# Patient Record
Sex: MALE | Race: WHITE | NOT HISPANIC OR LATINO | Employment: STUDENT | URBAN - METROPOLITAN AREA
[De-identification: names, ages, dates, MRNs, and addresses within clinical notes are randomized per-mention and may not be internally consistent; named-entity substitution may affect disease eponyms.]

---
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
## 2024-01-15 ENCOUNTER — APPOINTMENT (OUTPATIENT)
Dept: PRIMARY CARE | Facility: CLINIC | Age: 19
End: 2024-01-15
Payer: COMMERCIAL

## 2024-01-24 ENCOUNTER — APPOINTMENT (OUTPATIENT)
Dept: PRIMARY CARE | Facility: CLINIC | Age: 19
End: 2024-01-24
Payer: COMMERCIAL

## 2024-02-01 ENCOUNTER — LAB (OUTPATIENT)
Dept: LAB | Facility: LAB | Age: 19
End: 2024-02-01
Payer: COMMERCIAL

## 2024-02-01 ENCOUNTER — OFFICE VISIT (OUTPATIENT)
Dept: PRIMARY CARE | Facility: CLINIC | Age: 19
End: 2024-02-01
Payer: COMMERCIAL

## 2024-02-01 VITALS
SYSTOLIC BLOOD PRESSURE: 116 MMHG | DIASTOLIC BLOOD PRESSURE: 74 MMHG | RESPIRATION RATE: 18 BRPM | OXYGEN SATURATION: 98 % | HEIGHT: 64 IN | BODY MASS INDEX: 25.85 KG/M2 | HEART RATE: 97 BPM | WEIGHT: 151.4 LBS

## 2024-02-01 DIAGNOSIS — F41.9 ANXIETY: ICD-10-CM

## 2024-02-01 DIAGNOSIS — F41.9 ANXIETY: Primary | ICD-10-CM

## 2024-02-01 LAB
T4 FREE SERPL-MCNC: 0.79 NG/DL (ref 0.61–1.12)
TSH SERPL-ACNC: 1.37 MIU/L (ref 0.44–3.98)

## 2024-02-01 PROCEDURE — 99214 OFFICE O/P EST MOD 30 MIN: CPT | Performed by: NURSE PRACTITIONER

## 2024-02-01 PROCEDURE — 1036F TOBACCO NON-USER: CPT | Performed by: NURSE PRACTITIONER

## 2024-02-01 PROCEDURE — 84443 ASSAY THYROID STIM HORMONE: CPT

## 2024-02-01 PROCEDURE — 84439 ASSAY OF FREE THYROXINE: CPT

## 2024-02-01 PROCEDURE — 36415 COLL VENOUS BLD VENIPUNCTURE: CPT

## 2024-02-01 RX ORDER — SERTRALINE HYDROCHLORIDE 25 MG/1
25 TABLET, FILM COATED ORAL DAILY
Qty: 30 TABLET | Refills: 1 | Status: SHIPPED | OUTPATIENT
Start: 2024-02-01 | End: 2024-03-07 | Stop reason: SDUPTHER

## 2024-02-01 NOTE — PATIENT INSTRUCTIONS
Magnesium Glycinate 100-200 mg in am and pm- anxiety  Ashwaganda   Vitamin D 5000 units a day with food  Methylfolate + Methyl B12 for anxiety daily in the am liquid

## 2024-02-01 NOTE — PROGRESS NOTES
"Subjective   Patient ID: Chance Figueroa Jr. is a 18 y.o. male who presents for Follow-up (Chance is in today for routine 6 month F/U, he would like to discuss his increased anxiety. States it has not improved since his last OV. ).    18-year-old male here for follow-up uncontrolled anxiety- feels like throat closing, stomach aches feeling like N&V, frozen, racing heart  Does not know triggers.  Happens frequently.  Anxiety from 9 years old.   Tried counseling does not think that it helped and he does not like to talk about the anxiety because it causes more anxiety.   May 2023 he saw his PCP Jenni Ochoa NP she prescribed BuSpar.  Patient was prescribed twice daily however every time he took it it made him very dizzy and lightheaded and tired he was told to take both tablets at nighttime.  He still gets the same symptoms of dizzy lightheaded and it is not controlling anxiety.  I educated on the medication and mode of action.  He states a lot of times he feels like stuff gets stuck in his throat he is not quite sure if it is reflux or certain food.  And that is the very first symptom he gets with his anxiety is he feels like something is stuck in his throat         Review of Systems    Objective   /74   Pulse 97   Resp 18   Ht 1.626 m (5' 4\")   Wt 68.7 kg (151 lb 6.4 oz)   SpO2 98%   BMI 25.99 kg/m²     Physical Exam  Constitutional:       Appearance: He is obese.   Neck:      Comments: Thyroid gland is palpable I cannot say it is enlarged.  It is rubbery and smooth and I cannot appreciate any nodules.  Nontender and no cervical lymph nodes palpated  Cardiovascular:      Rate and Rhythm: Normal rate and regular rhythm.   Pulmonary:      Effort: Pulmonary effort is normal.      Breath sounds: Normal breath sounds.   Abdominal:      General: Bowel sounds are normal.      Palpations: Abdomen is soft.   Musculoskeletal:         General: Normal range of motion.   Neurological:      Mental Status: He " is alert and oriented to person, place, and time.   Psychiatric:         Mood and Affect: Mood normal.         Behavior: Behavior normal.         Assessment/Plan   Diagnoses and all orders for this visit:  Anxiety  Comments:  Uncontrolled with what sounds like panic.  Orders:  -     sertraline (Zoloft) 25 mg tablet; Take 1 tablet (25 mg) by mouth once daily.  -     Thyroid Stimulating Hormone; Future  -     Thyroxine, Free; Future    I discussed anxiety possible causes.  He has not had his thyroid checked.  He does have a pronounced thyroid gland.  Will check a TSH and free T4.  In the past his vitamin D levels were low I encouraged him to take vitamin D daily to help with chronic inflammation and mood.  I educated on different types of medications to use for anxiety.  It would be better to prevent since his symptoms are not very frequent.  I think Lexapro and Celexa would be too strong for him.  He tried Paxil in the past without benefit.    Sertraline seems to be the one that makes the most sense to start at this time.  I discussed the possibility of worsening anxiety or new onset depression with the use of any antianxiety antidepressants.  He lives with his parents I asked that he would let them know that he is on a new medication so they can watch for changes in mood.  He should stop the medication ASAP if he should develop worsening anxiety or depression.  Let me know in a month how he is feeling on the sertraline 25 mg daily and we will determine the next step

## 2024-02-12 DIAGNOSIS — R29.898 LEG WEAKNESS, BILATERAL: Primary | ICD-10-CM

## 2024-02-13 ENCOUNTER — LAB (OUTPATIENT)
Dept: LAB | Facility: LAB | Age: 19
End: 2024-02-13
Payer: COMMERCIAL

## 2024-02-13 DIAGNOSIS — R29.898 LEG WEAKNESS, BILATERAL: ICD-10-CM

## 2024-02-13 PROCEDURE — 36415 COLL VENOUS BLD VENIPUNCTURE: CPT

## 2024-02-13 PROCEDURE — 80053 COMPREHEN METABOLIC PANEL: CPT

## 2024-02-14 LAB
ALBUMIN SERPL BCP-MCNC: 5 G/DL (ref 3.4–5)
ALP SERPL-CCNC: 63 U/L (ref 33–120)
ALT SERPL W P-5'-P-CCNC: 20 U/L (ref 10–52)
ANION GAP SERPL CALC-SCNC: 12 MMOL/L (ref 10–20)
AST SERPL W P-5'-P-CCNC: 18 U/L (ref 9–39)
BILIRUB SERPL-MCNC: 0.7 MG/DL (ref 0–1.2)
BUN SERPL-MCNC: 13 MG/DL (ref 6–23)
CALCIUM SERPL-MCNC: 9.7 MG/DL (ref 8.6–10.3)
CHLORIDE SERPL-SCNC: 98 MMOL/L (ref 98–107)
CO2 SERPL-SCNC: 30 MMOL/L (ref 21–32)
CREAT SERPL-MCNC: 0.97 MG/DL (ref 0.5–1.3)
EGFRCR SERPLBLD CKD-EPI 2021: >90 ML/MIN/1.73M*2
GLUCOSE SERPL-MCNC: 84 MG/DL (ref 74–99)
POTASSIUM SERPL-SCNC: 4.4 MMOL/L (ref 3.5–5.3)
PROT SERPL-MCNC: 7.5 G/DL (ref 6.4–8.2)
SODIUM SERPL-SCNC: 136 MMOL/L (ref 136–145)

## 2024-03-07 ENCOUNTER — OFFICE VISIT (OUTPATIENT)
Dept: PRIMARY CARE | Facility: CLINIC | Age: 19
End: 2024-03-07
Payer: COMMERCIAL

## 2024-03-07 VITALS
BODY MASS INDEX: 26.46 KG/M2 | SYSTOLIC BLOOD PRESSURE: 116 MMHG | OXYGEN SATURATION: 97 % | WEIGHT: 155 LBS | HEART RATE: 89 BPM | RESPIRATION RATE: 18 BRPM | DIASTOLIC BLOOD PRESSURE: 80 MMHG | HEIGHT: 64 IN

## 2024-03-07 DIAGNOSIS — R53.83 OTHER FATIGUE: ICD-10-CM

## 2024-03-07 DIAGNOSIS — F41.9 ANXIETY: Primary | ICD-10-CM

## 2024-03-07 PROCEDURE — 99213 OFFICE O/P EST LOW 20 MIN: CPT | Performed by: NURSE PRACTITIONER

## 2024-03-07 PROCEDURE — 1036F TOBACCO NON-USER: CPT | Performed by: NURSE PRACTITIONER

## 2024-03-07 RX ORDER — SERTRALINE HYDROCHLORIDE 25 MG/1
25 TABLET, FILM COATED ORAL DAILY
Qty: 90 TABLET | Refills: 3 | Status: SHIPPED | OUTPATIENT
Start: 2024-03-07 | End: 2024-04-23

## 2024-03-07 NOTE — PROGRESS NOTES
"Subjective   Patient ID: Chance Figueroa Jr. is a 18 y.o. male who presents for medication follow up (Chance is in today to discuss medication. States that he has noticed a somewhat of a difference since taking the medication but not \"100 %\" stated that he feels fatigued at times. ).    Here for follow up since starting the Sertraline 25 mg po q hs for anxiety. Still with some anxiety but improving; more fatigued and not sure why.  No depression or worsening anxiety  Sleep 8 hours a night but lately feels like needs a nap during the day. Wakes up sleepy.   Taking vitamin D 5,000 a day, magnesium 100mg at night, , ashwaganda 300mg at hs.   No other acute issues  On Spring break from school and off work for 1 week.          Review of Systems    Objective   /80   Pulse 89   Resp 18   Ht 1.626 m (5' 4\")   Wt 70.3 kg (155 lb)   SpO2 97%   BMI 26.61 kg/m²     Physical Exam  Constitutional:       Appearance: He is normal weight.   Cardiovascular:      Rate and Rhythm: Normal rate and regular rhythm.   Pulmonary:      Effort: Pulmonary effort is normal.      Breath sounds: Normal breath sounds.   Abdominal:      General: Bowel sounds are normal.      Palpations: Abdomen is soft.   Musculoskeletal:         General: Normal range of motion.   Neurological:      Mental Status: He is alert and oriented to person, place, and time.   Psychiatric:         Mood and Affect: Mood normal.         Behavior: Behavior normal.         Assessment/Plan   Diagnoses and all orders for this visit:  Anxiety  Comments:  better on the sertraline. no change in dose for now.  Orders:  -     sertraline (Zoloft) 25 mg tablet; Take 1 tablet (25 mg) by mouth once daily.  Other fatigue  Comments:  possibly related to the ashwaganda. he can decrasee to 1/2 or stop them and see how he feels.  Other orders  -     Follow Up In Primary Care - Established  -     Follow Up In Primary Care - Established; Future    He can message me an update and " follow up in 3 months.   3 month supply sertraline sent to the pharmacy

## 2024-04-18 DIAGNOSIS — F41.9 ANXIETY: ICD-10-CM

## 2024-04-23 RX ORDER — SERTRALINE HYDROCHLORIDE 25 MG/1
25 TABLET, FILM COATED ORAL DAILY
Qty: 90 TABLET | Refills: 1 | Status: SHIPPED | OUTPATIENT
Start: 2024-04-23 | End: 2024-06-11 | Stop reason: ALTCHOICE

## 2024-06-11 ENCOUNTER — OFFICE VISIT (OUTPATIENT)
Dept: PRIMARY CARE | Facility: CLINIC | Age: 19
End: 2024-06-11
Payer: COMMERCIAL

## 2024-06-11 VITALS
OXYGEN SATURATION: 98 % | HEART RATE: 90 BPM | HEIGHT: 64 IN | SYSTOLIC BLOOD PRESSURE: 137 MMHG | WEIGHT: 159 LBS | BODY MASS INDEX: 27.14 KG/M2 | DIASTOLIC BLOOD PRESSURE: 83 MMHG

## 2024-06-11 DIAGNOSIS — F41.9 ANXIETY: Primary | ICD-10-CM

## 2024-06-11 DIAGNOSIS — M25.831 MASS OF JOINT OF RIGHT WRIST: ICD-10-CM

## 2024-06-11 PROCEDURE — 1036F TOBACCO NON-USER: CPT | Performed by: NURSE PRACTITIONER

## 2024-06-11 PROCEDURE — 99214 OFFICE O/P EST MOD 30 MIN: CPT | Performed by: NURSE PRACTITIONER

## 2024-06-11 RX ORDER — SERTRALINE HYDROCHLORIDE 50 MG/1
50 TABLET, FILM COATED ORAL DAILY
Qty: 90 TABLET | Refills: 3 | Status: SHIPPED | OUTPATIENT
Start: 2024-06-11

## 2024-06-11 NOTE — PATIENT INSTRUCTIONS
[facility] this can help with tools to use for preventing the anxiety and controlling the anxiety

## 2024-06-11 NOTE — PROGRESS NOTES
"Subjective   Patient ID: Chance Figueroa Jr. is a 18 y.o. male who presents for Anxiety (Patient mentioned he is here today for his anxiety. Patient feels he was doing better after the last visit but now he is doing the same as before. ).    18 year old male here due to worsening anxiety. Here to review the anxiety which has gotten worse in the past month. Unknown reason. Denies stress. increased panic mostly at night  On the b12, vitamin D   Stopped the mag  Wondering about a MVI  RT wrist lump pain with ROM. New. Had an ultrasound at work and was told it looked like fluid. No injury. RT handed    Anxiety             Review of Systems    Objective   /83   Pulse 90   Ht 1.626 m (5' 4\")   Wt 72.1 kg (159 lb)   SpO2 98%   BMI 27.29 kg/m²     Physical Exam  Constitutional:       Appearance: Normal appearance.   Cardiovascular:      Rate and Rhythm: Normal rate and regular rhythm.   Pulmonary:      Effort: Pulmonary effort is normal.      Breath sounds: Normal breath sounds.   Musculoskeletal:         General: Normal range of motion.        Arms:       Comments: Nodular mass anterior wrist. No skin discoloration or change in temp   Neurological:      General: No focal deficit present.      Mental Status: He is alert and oriented to person, place, and time. Mental status is at baseline.   Psychiatric:         Mood and Affect: Mood normal.         Behavior: Behavior normal.         Thought Content: Thought content normal.         Judgment: Judgment normal.         Assessment/Plan   Diagnoses and all orders for this visit:  Anxiety  Comments:  Sertraline dose needs to be increased to 50 mg daily.  Referral placed for therapy close to home.  Call me as needed  Orders:  -     sertraline (Zoloft) 50 mg tablet; Take 1 tablet (50 mg) by mouth once daily.  Mass of joint of right wrist  Comments:  possible cyst. he wants to see Dr Trevor galvan at [facility]  Other orders  -     Follow Up In Primary Care " - Established

## 2024-08-14 ASSESSMENT — PROMIS GLOBAL HEALTH SCALE
RATE_GENERAL_HEALTH: VERY GOOD
RATE_AVERAGE_FATIGUE: MODERATE
EMOTIONAL_PROBLEMS: OFTEN
RATE_PHYSICAL_HEALTH: GOOD
RATE_MENTAL_HEALTH: VERY GOOD
CARRYOUT_PHYSICAL_ACTIVITIES: COMPLETELY
CARRYOUT_SOCIAL_ACTIVITIES: EXCELLENT
RATE_QUALITY_OF_LIFE: EXCELLENT
RATE_AVERAGE_PAIN: 2
RATE_SOCIAL_SATISFACTION: EXCELLENT

## 2024-08-15 ENCOUNTER — APPOINTMENT (OUTPATIENT)
Dept: PRIMARY CARE | Facility: CLINIC | Age: 19
End: 2024-08-15
Payer: COMMERCIAL

## 2024-08-15 ENCOUNTER — LAB (OUTPATIENT)
Dept: LAB | Facility: LAB | Age: 19
End: 2024-08-15
Payer: COMMERCIAL

## 2024-08-15 VITALS
DIASTOLIC BLOOD PRESSURE: 79 MMHG | WEIGHT: 161 LBS | BODY MASS INDEX: 27.49 KG/M2 | HEIGHT: 64 IN | SYSTOLIC BLOOD PRESSURE: 122 MMHG | HEART RATE: 90 BPM | OXYGEN SATURATION: 98 %

## 2024-08-15 DIAGNOSIS — Z02.0 SCHOOL PHYSICAL EXAM: ICD-10-CM

## 2024-08-15 LAB
BASOPHILS # BLD AUTO: 0.04 X10*3/UL (ref 0–0.1)
BASOPHILS NFR BLD AUTO: 0.7 %
EOSINOPHIL # BLD AUTO: 0.12 X10*3/UL (ref 0–0.7)
EOSINOPHIL NFR BLD AUTO: 2.1 %
ERYTHROCYTE [DISTWIDTH] IN BLOOD BY AUTOMATED COUNT: 12.2 % (ref 11.5–14.5)
HCT VFR BLD AUTO: 48 % (ref 41–52)
HGB BLD-MCNC: 16.4 G/DL (ref 13.5–17.5)
IMM GRANULOCYTES # BLD AUTO: 0.02 X10*3/UL (ref 0–0.7)
IMM GRANULOCYTES NFR BLD AUTO: 0.4 % (ref 0–0.9)
LYMPHOCYTES # BLD AUTO: 1.75 X10*3/UL (ref 1.2–4.8)
LYMPHOCYTES NFR BLD AUTO: 31 %
MCH RBC QN AUTO: 29.2 PG (ref 26–34)
MCHC RBC AUTO-ENTMCNC: 34.2 G/DL (ref 32–36)
MCV RBC AUTO: 86 FL (ref 80–100)
MONOCYTES # BLD AUTO: 0.58 X10*3/UL (ref 0.1–1)
MONOCYTES NFR BLD AUTO: 10.3 %
NEUTROPHILS # BLD AUTO: 3.13 X10*3/UL (ref 1.2–7.7)
NEUTROPHILS NFR BLD AUTO: 55.5 %
NRBC BLD-RTO: 0 /100 WBCS (ref 0–0)
PLATELET # BLD AUTO: 255 X10*3/UL (ref 150–450)
POC APPEARANCE, URINE: CLEAR
POC BILIRUBIN, URINE: NEGATIVE
POC BLOOD, URINE: NEGATIVE
POC COLOR, URINE: YELLOW
POC GLUCOSE, URINE: NEGATIVE MG/DL
POC KETONES, URINE: NEGATIVE MG/DL
POC LEUKOCYTES, URINE: NEGATIVE
POC NITRITE,URINE: NEGATIVE
POC PH, URINE: 6 PH
POC PROTEIN, URINE: NEGATIVE MG/DL
POC SPECIFIC GRAVITY, URINE: >=1.03
POC UROBILINOGEN, URINE: 0.2 EU/DL
RBC # BLD AUTO: 5.61 X10*6/UL (ref 4.5–5.9)
WBC # BLD AUTO: 5.6 X10*3/UL (ref 4.4–11.3)

## 2024-08-15 PROCEDURE — 3008F BODY MASS INDEX DOCD: CPT | Performed by: NURSE PRACTITIONER

## 2024-08-15 PROCEDURE — 36415 COLL VENOUS BLD VENIPUNCTURE: CPT

## 2024-08-15 PROCEDURE — 81003 URINALYSIS AUTO W/O SCOPE: CPT | Performed by: NURSE PRACTITIONER

## 2024-08-15 PROCEDURE — 85025 COMPLETE CBC W/AUTO DIFF WBC: CPT

## 2024-08-15 PROCEDURE — 1036F TOBACCO NON-USER: CPT | Performed by: NURSE PRACTITIONER

## 2024-08-15 PROCEDURE — 99395 PREV VISIT EST AGE 18-39: CPT | Performed by: NURSE PRACTITIONER

## 2024-08-15 NOTE — PROGRESS NOTES
"Subjective   Patient ID: Levy Figueroa Jr. is a 18 y.o. 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 ).    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  Remains on antibiotic feeling better afebrile no longer with sore throat.  Denies cough fever chills  Chronic anxiety.  Takes sertraline 50 mg daily.  No side effects states it is working well and he is doing very well  No other acute concerns    Prevention:  Breast Ca screen: Denies family history  Colon Ca Screen: Denies family history  Lung Ca Screen: Non-smoker no vaping.  Denies family history  Prostate Ca Screen: Denies family history  DEXA: Not indicated  CT Cardiac Score: Not indicated  Diabetes Screen: Denies family history.  He is gaining weight due to poor dietary choices we talked about this in the past and again today  Opthal: No acute concerns.  Dental: Regular checkups without acute concerns  Diet: High sugar content.  No pop  Work: as CNA.  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  Recent TB test through [facility].    He has chronic low back pain.  He feels he has good posture.  Chronic bilateral hip pain.  Has not had an x-ray of his lumbar spine            Review of Systems    Objective   /79   Pulse 90   Ht 1.626 m (5' 4\")   Wt 73 kg (161 lb)   SpO2 98%   BMI 27.64 kg/m²     Physical Exam  Vitals reviewed.   Constitutional:       General: He is not in acute distress.     Appearance: Normal appearance. He is obese.   HENT:      Head: Normocephalic and atraumatic.   Eyes:      Extraocular Movements: Extraocular movements intact.      Conjunctiva/sclera: Conjunctivae normal.      Pupils: Pupils are equal, round, and reactive to light.   Neck:      Vascular: No carotid bruit.   Cardiovascular:      Rate and Rhythm: Normal rate and regular " rhythm.      Pulses: Normal pulses.      Heart sounds: No murmur heard.  Pulmonary:      Effort: Pulmonary effort is normal.      Breath sounds: Normal breath sounds.   Abdominal:      General: Bowel sounds are normal. There is no distension.      Palpations: Abdomen is soft.      Tenderness: There is no abdominal tenderness.   Musculoskeletal:         General: Normal range of motion.      Cervical back: Normal range of motion and neck supple.      Comments: Spine is midline he does have very tight muscles of the lumbar area   Lymphadenopathy:      Cervical: No cervical adenopathy.   Skin:     General: Skin is warm and dry.   Neurological:      General: No focal deficit present.      Mental Status: He is alert and oriented to person, place, and time. Mental status is at baseline.   Psychiatric:         Mood and Affect: Mood normal.         Behavior: Behavior normal.         Assessment/Plan   Diagnoses and all orders for this visit:  School physical exam  Comments:  UA and CBC ordered per the request of the school.   Printout of his vaccinations given to the patient.  He may need titers of each vaccine  Orders:  -     CBC and Auto Differential; Future  -     POCT UA Automated manually resulted    Up-to-date on vaccinations.  [facility].  Will reach out to the patient to see if his school is requiring titers to be drawn on rubella, rubeola, mumps, varicella, Hep B

## 2024-08-19 DIAGNOSIS — Z92.29: ICD-10-CM

## 2024-08-19 DIAGNOSIS — Z92.29 HISTORY OF MEASLES, MUMPS, RUBELLA AND VARICELLA VIRUS VACCINE: Primary | ICD-10-CM

## 2024-08-19 DIAGNOSIS — Z86.19 HISTORY OF CHICKEN POX: ICD-10-CM

## 2024-08-21 ENCOUNTER — LAB (OUTPATIENT)
Dept: LAB | Facility: LAB | Age: 19
End: 2024-08-21
Payer: COMMERCIAL

## 2024-08-21 DIAGNOSIS — Z92.29 HISTORY OF MEASLES, MUMPS, RUBELLA AND VARICELLA VIRUS VACCINE: ICD-10-CM

## 2024-08-21 DIAGNOSIS — Z92.29: ICD-10-CM

## 2024-08-21 PROCEDURE — 86706 HEP B SURFACE ANTIBODY: CPT

## 2024-08-21 PROCEDURE — 86787 VARICELLA-ZOSTER ANTIBODY: CPT

## 2024-08-21 PROCEDURE — 86735 MUMPS ANTIBODY: CPT

## 2024-08-21 PROCEDURE — 86317 IMMUNOASSAY INFECTIOUS AGENT: CPT

## 2024-08-21 PROCEDURE — 36415 COLL VENOUS BLD VENIPUNCTURE: CPT

## 2024-08-21 PROCEDURE — 86765 RUBEOLA ANTIBODY: CPT

## 2024-08-22 LAB
HBV SURFACE AB SER-ACNC: >1000 MIU/ML
MEV IGG SER QL IA: NEGATIVE
MUMPS IGG ANTIBODY INDEX: 2 IA
MUV IGG SER IA-ACNC: POSITIVE
RUBEOLA IGG ANTIBODY INDEX: 0.7 IA
RUBV IGG SERPL IA-ACNC: 1.8 IA
RUBV IGG SERPL QL IA: POSITIVE
VARICELLA ZOSTER IGG INDEX: 6.3 IA
VZV IGG SER QL IA: POSITIVE

## 2024-08-27 ASSESSMENT — ENCOUNTER SYMPTOMS
NECK PAIN: 1
TROUBLE SWALLOWING: 1
DIARRHEA: 1
SWOLLEN GLANDS: 1
HEADACHES: 1
COUGH: 1
SORE THROAT: 1

## 2024-08-28 ENCOUNTER — OFFICE VISIT (OUTPATIENT)
Dept: PRIMARY CARE | Facility: CLINIC | Age: 19
End: 2024-08-28
Payer: COMMERCIAL

## 2024-08-28 VITALS
OXYGEN SATURATION: 96 % | HEIGHT: 64 IN | WEIGHT: 161 LBS | HEART RATE: 99 BPM | SYSTOLIC BLOOD PRESSURE: 132 MMHG | DIASTOLIC BLOOD PRESSURE: 80 MMHG | BODY MASS INDEX: 27.49 KG/M2

## 2024-08-28 DIAGNOSIS — J02.9 SORE THROAT: Primary | ICD-10-CM

## 2024-08-28 PROCEDURE — 99213 OFFICE O/P EST LOW 20 MIN: CPT | Performed by: NURSE PRACTITIONER

## 2024-08-28 PROCEDURE — 3008F BODY MASS INDEX DOCD: CPT | Performed by: NURSE PRACTITIONER

## 2024-08-28 ASSESSMENT — ENCOUNTER SYMPTOMS
COUGH: 1
TROUBLE SWALLOWING: 1
SWOLLEN GLANDS: 1
SORE THROAT: 1
FATIGUE: 1
ABDOMINAL PAIN: 1
MUSCULOSKELETAL PAIN: 1
NECK PAIN: 1
DIARRHEA: 1
HEADACHES: 1

## 2024-08-28 NOTE — PROGRESS NOTES
"Subjective   Patient ID: Levy Figueroa Jr. is a 18 y.o. male who presents for Sore Throat (Patient is here today for a sore throat that he has had for 5 days. Stated he has tested negative for strep and COVID. Patient did have strep about 3 weeks ago and was put on antibiotics), Fatigue, Abdominal Pain, Muscle Pain, and Headache.    18-year-old male here for acute sick visit.  He states that for 5 days he was having a sore throat, LT abd sharp pain. No fever. Headache. Fatigue, lightheaded  Mild sore throat today \"irritated\". \"I'm fine now\".   Has been working without any further symptoms.  Strep neg at [facility].  I discussed mono with him he does not know who he would have gotten mono from he has not been in relationship that would put him at high risk for at least 3 years.  He does not share utensils or beverages.  And in general his symptoms are improving    Sore Throat   This is a new problem. The current episode started in the past 7 days. The problem has been waxing and waning. The pain is worse on the left side. There has been no fever. The pain is at a severity of 6/10. The pain is severe. Associated symptoms include abdominal pain, coughing, diarrhea, ear pain, headaches, neck pain, swollen glands and trouble swallowing. He has had exposure to strep.        Review of Systems   Constitutional:  Positive for fatigue.   HENT:  Positive for ear pain, sore throat and trouble swallowing.    Respiratory:  Positive for cough.    Gastrointestinal:  Positive for abdominal pain and diarrhea.   Musculoskeletal:  Positive for neck pain.   Neurological:  Positive for headaches.       Objective   /80   Pulse 99   Ht 1.626 m (5' 4\")   Wt 73 kg (161 lb)   SpO2 96%   BMI 27.64 kg/m²     Physical Exam  Constitutional:       Appearance: Normal appearance.   HENT:      Head: Normocephalic and atraumatic.      Nose: Nose normal.      Mouth/Throat:      Mouth: Mucous membranes are moist.      Pharynx: Posterior " oropharyngeal erythema present.   Musculoskeletal:      Cervical back: Normal range of motion and neck supple. No tenderness.   Lymphadenopathy:      Cervical: No cervical adenopathy.   Neurological:      Mental Status: He is alert.         Assessment/Plan   Diagnoses and all orders for this visit:  Sore throat  Comments:  I agree with [facility] clinic this is viral pharyngitis.  Call as needed if symptoms return or worsen

## 2025-01-17 ENCOUNTER — PATIENT MESSAGE (OUTPATIENT)
Dept: PRIMARY CARE | Facility: CLINIC | Age: 20
End: 2025-01-17
Payer: COMMERCIAL

## 2025-01-27 ENCOUNTER — APPOINTMENT (OUTPATIENT)
Dept: PRIMARY CARE | Facility: CLINIC | Age: 20
End: 2025-01-27
Payer: COMMERCIAL

## 2025-01-27 VITALS
SYSTOLIC BLOOD PRESSURE: 130 MMHG | HEART RATE: 90 BPM | HEIGHT: 63 IN | WEIGHT: 165 LBS | DIASTOLIC BLOOD PRESSURE: 83 MMHG | OXYGEN SATURATION: 96 % | BODY MASS INDEX: 29.23 KG/M2

## 2025-01-27 DIAGNOSIS — S03.00XA DISLOCATION OF TEMPOROMANDIBULAR JOINT, INITIAL ENCOUNTER: Primary | ICD-10-CM

## 2025-01-27 DIAGNOSIS — J02.9 SORE THROAT: ICD-10-CM

## 2025-01-27 PROCEDURE — 99213 OFFICE O/P EST LOW 20 MIN: CPT

## 2025-01-27 PROCEDURE — 3008F BODY MASS INDEX DOCD: CPT

## 2025-01-27 RX ORDER — AMOXICILLIN 500 MG/1
500 CAPSULE ORAL EVERY 12 HOURS SCHEDULED
Qty: 14 CAPSULE | Refills: 0 | Status: SHIPPED | OUTPATIENT
Start: 2025-01-27 | End: 2025-02-03

## 2025-01-27 ASSESSMENT — PATIENT HEALTH QUESTIONNAIRE - PHQ9
2. FEELING DOWN, DEPRESSED OR HOPELESS: NOT AT ALL
1. LITTLE INTEREST OR PLEASURE IN DOING THINGS: NOT AT ALL
SUM OF ALL RESPONSES TO PHQ9 QUESTIONS 1 AND 2: 0

## 2025-01-27 NOTE — PROGRESS NOTES
"Subjective   Patient ID: Levy Figueroa Jr. is a 19 y.o. male who presents for Jaw Pain (Levy is here for left jaw and neck pain which causing headaches x 2 weeks. States he had x ray done and they did not find anything).    HPI   Pt in today with left jaw and neck pain. He had his wisdom teeth out in Dec. No issues. He did go and see the oral surgeon that pulled his wisdom teeth and he told him it was his TMJ. He then went to hid regular dentist and Xrays were done and they did not see anything on the xray. His dentist is going to make him a bite plate. He is having throat pain. Feels that it is swollen on the left side.     Review of Systems   All other systems reviewed and are negative.      Objective   /83   Pulse 90   Ht 1.6 m (5' 3\")   Wt 74.8 kg (165 lb)   SpO2 96%   BMI 29.23 kg/m²     Physical Exam  Vitals reviewed.   Constitutional:       Appearance: Normal appearance.   HENT:      Mouth/Throat:      Pharynx: Posterior oropharyngeal erythema present.   Cardiovascular:      Rate and Rhythm: Normal rate and regular rhythm.      Pulses: Normal pulses.      Heart sounds: Normal heart sounds.   Pulmonary:      Effort: Pulmonary effort is normal.      Breath sounds: Normal breath sounds.   Neurological:      General: No focal deficit present.      Mental Status: He is alert and oriented to person, place, and time.   Psychiatric:         Mood and Affect: Mood normal.         Behavior: Behavior normal.         Assessment/Plan   Diagnoses and all orders for this visit:  Dislocation of temporomandibular joint, initial encounter  Sore throat  -     amoxicillin (Amoxil) 500 mg capsule; Take 1 capsule (500 mg) by mouth every 12 hours for 7 days.       "

## 2025-01-29 PROBLEM — J02.9 SORE THROAT: Status: ACTIVE | Noted: 2023-05-08

## 2025-03-31 ENCOUNTER — APPOINTMENT (OUTPATIENT)
Dept: PRIMARY CARE | Facility: CLINIC | Age: 20
End: 2025-03-31
Payer: COMMERCIAL

## 2025-03-31 VITALS
DIASTOLIC BLOOD PRESSURE: 71 MMHG | HEIGHT: 64 IN | SYSTOLIC BLOOD PRESSURE: 127 MMHG | BODY MASS INDEX: 30.05 KG/M2 | HEART RATE: 86 BPM | OXYGEN SATURATION: 96 % | WEIGHT: 176 LBS

## 2025-03-31 DIAGNOSIS — R07.9 CHEST PAIN, UNSPECIFIED TYPE: Primary | ICD-10-CM

## 2025-03-31 PROCEDURE — 3008F BODY MASS INDEX DOCD: CPT

## 2025-03-31 PROCEDURE — 93000 ELECTROCARDIOGRAM COMPLETE: CPT

## 2025-03-31 PROCEDURE — 99214 OFFICE O/P EST MOD 30 MIN: CPT

## 2025-03-31 RX ORDER — METHYLPREDNISOLONE 4 MG/1
TABLET ORAL
Qty: 21 TABLET | Refills: 0 | Status: SHIPPED | OUTPATIENT
Start: 2025-03-31 | End: 2025-04-06

## 2025-03-31 RX ORDER — CYANOCOBALAMIN (VITAMIN B-12) 250 MCG
250 TABLET ORAL DAILY
COMMUNITY

## 2025-03-31 ASSESSMENT — ENCOUNTER SYMPTOMS
SORE THROAT: 0
CHILLS: 0
DIARRHEA: 0
NAUSEA: 0
SINUS PAIN: 0
SINUS PRESSURE: 0
DYSURIA: 0
SHORTNESS OF BREATH: 0
FEVER: 0
CHEST TIGHTNESS: 0
ABDOMINAL PAIN: 0
CONSTIPATION: 0

## 2025-03-31 ASSESSMENT — PATIENT HEALTH QUESTIONNAIRE - PHQ9
2. FEELING DOWN, DEPRESSED OR HOPELESS: NOT AT ALL
SUM OF ALL RESPONSES TO PHQ9 QUESTIONS 1 AND 2: 0
1. LITTLE INTEREST OR PLEASURE IN DOING THINGS: NOT AT ALL

## 2025-03-31 NOTE — PROGRESS NOTES
"Subjective   Patient ID: Levy Figueroa Jr. is a 19 y.o. male who presents for Chest Pain (Levy is here today for rib pain/ chest pain last few weeks, daul pain becomes sharp at times, denies work injury).    HPI   Pt here today with rib/chest pain. Started a few weeks ago. Does not recall injuring it. It is a dull pain that sometimes becomes sharp. Not taking anything for the pain. It comes and goes. EKG in office showed NSR. Deneis feeling short of breath. 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   Has never seen cardiology.   Review of Systems   Constitutional:  Negative for chills and fever.   HENT:  Negative for ear pain, sinus pressure, sinus pain and sore throat.    Respiratory:  Negative for chest tightness and shortness of breath.    Cardiovascular:  Negative for chest pain.   Gastrointestinal:  Negative for abdominal pain, constipation, diarrhea and nausea.   Genitourinary:  Negative for dysuria.   Skin:  Negative for rash.   Neurological:  Negative for syncope.       Objective   /71   Pulse 86   Ht 1.626 m (5' 4\")   Wt 79.8 kg (176 lb)   SpO2 96%   BMI 30.21 kg/m²     Physical Exam  Constitutional:       Appearance: Normal appearance. He is normal weight.   HENT:      Right Ear: Tympanic membrane normal. There is no impacted cerumen.      Left Ear: Tympanic membrane normal. There is no impacted cerumen.      Mouth/Throat:      Mouth: Mucous membranes are moist.   Eyes:      Conjunctiva/sclera: Conjunctivae normal.   Cardiovascular:      Rate and Rhythm: Normal rate.      Pulses: Normal pulses.      Heart sounds: Normal heart sounds.   Pulmonary:      Effort: Pulmonary effort is normal.   Abdominal:      General: Abdomen is flat. Bowel sounds are normal.      Palpations: Abdomen is soft.   Musculoskeletal:         General: Normal range of motion.   Skin:     General: Skin is warm and dry.      Capillary Refill: Capillary refill takes less than 2 seconds. "   Neurological:      General: No focal deficit present.      Mental Status: He is alert.   Psychiatric:         Mood and Affect: Mood normal.       Assessment/Plan   Diagnoses and all orders for this visit:  Chest pain, unspecified type  Comments:  EKG done in office normal.   Referral to cardiology.  Orders:  -     methylPREDNISolone (Medrol Dospak) 4 mg tablets; Take as directed on package.  -     Referral to Cardiology; Future  -     CBC and Auto Differential; Future  -     Comprehensive metabolic panel; Future

## 2025-03-31 NOTE — LETTER
March 31, 2025     Patient: Chance Figueroa Jr.   YOB: 2005   Date of Visit: 3/31/2025       To Whom It May Concern:    Chance Figueroa was seen in my clinic on 3/31/2025 at 3:20 pm. Please excuse Chance for his absence from school on this day to make the appointment. He has a cardiology appointment on 4/9/2025 @ 3:20 can he please be excused a few minutes early to make it to the appointment.     If you have any questions or concerns, please don't hesitate to call.         Sincerely,         Deandra Moore, BOBBY-CNP        CC: No Recipients

## 2025-04-01 LAB
ALBUMIN SERPL-MCNC: 5.1 G/DL (ref 3.6–5.1)
ALP SERPL-CCNC: 60 U/L (ref 36–130)
ALT SERPL-CCNC: 37 U/L (ref 9–46)
ANION GAP SERPL CALCULATED.4IONS-SCNC: 10 MMOL/L (CALC) (ref 7–17)
AST SERPL-CCNC: 23 U/L (ref 10–40)
BASOPHILS # BLD AUTO: 38 CELLS/UL (ref 0–200)
BASOPHILS NFR BLD AUTO: 0.6 %
BILIRUB SERPL-MCNC: 0.6 MG/DL (ref 0.2–1.2)
BUN SERPL-MCNC: 15 MG/DL (ref 7–25)
CALCIUM SERPL-MCNC: 10.1 MG/DL (ref 8.6–10.3)
CHLORIDE SERPL-SCNC: 100 MMOL/L (ref 98–110)
CO2 SERPL-SCNC: 30 MMOL/L (ref 20–32)
CREAT SERPL-MCNC: 0.86 MG/DL (ref 0.6–1.24)
EGFRCR SERPLBLD CKD-EPI 2021: 127 ML/MIN/1.73M2
EOSINOPHIL # BLD AUTO: 58 CELLS/UL (ref 15–500)
EOSINOPHIL NFR BLD AUTO: 0.9 %
ERYTHROCYTE [DISTWIDTH] IN BLOOD BY AUTOMATED COUNT: 12.9 % (ref 11–15)
GLUCOSE SERPL-MCNC: 84 MG/DL (ref 65–139)
HCT VFR BLD AUTO: 49.1 % (ref 38.5–50)
HGB BLD-MCNC: 16.9 G/DL (ref 13.2–17.1)
LYMPHOCYTES # BLD AUTO: 2093 CELLS/UL (ref 850–3900)
LYMPHOCYTES NFR BLD AUTO: 32.7 %
MCH RBC QN AUTO: 29.8 PG (ref 27–33)
MCHC RBC AUTO-ENTMCNC: 34.4 G/DL (ref 32–36)
MCV RBC AUTO: 86.4 FL (ref 80–100)
MONOCYTES # BLD AUTO: 480 CELLS/UL (ref 200–950)
MONOCYTES NFR BLD AUTO: 7.5 %
NEUTROPHILS # BLD AUTO: 3731 CELLS/UL (ref 1500–7800)
NEUTROPHILS NFR BLD AUTO: 58.3 %
PLATELET # BLD AUTO: 285 THOUSAND/UL (ref 140–400)
PMV BLD REES-ECKER: 10.7 FL (ref 7.5–12.5)
POTASSIUM SERPL-SCNC: 4.2 MMOL/L (ref 3.5–5.3)
PROT SERPL-MCNC: 7.9 G/DL (ref 6.1–8.1)
RBC # BLD AUTO: 5.68 MILLION/UL (ref 4.2–5.8)
SODIUM SERPL-SCNC: 140 MMOL/L (ref 135–146)
WBC # BLD AUTO: 6.4 THOUSAND/UL (ref 3.8–10.8)

## 2025-04-09 ENCOUNTER — OFFICE VISIT (OUTPATIENT)
Dept: CARDIOLOGY | Facility: HOSPITAL | Age: 20
End: 2025-04-09
Payer: COMMERCIAL

## 2025-04-09 VITALS
SYSTOLIC BLOOD PRESSURE: 129 MMHG | WEIGHT: 175.27 LBS | OXYGEN SATURATION: 96 % | HEART RATE: 110 BPM | DIASTOLIC BLOOD PRESSURE: 79 MMHG | BODY MASS INDEX: 30.08 KG/M2

## 2025-04-09 DIAGNOSIS — R00.2 HEART PALPITATIONS: Primary | ICD-10-CM

## 2025-04-09 DIAGNOSIS — R07.9 CHEST PAIN, UNSPECIFIED TYPE: ICD-10-CM

## 2025-04-09 DIAGNOSIS — R06.02 SHORTNESS OF BREATH: ICD-10-CM

## 2025-04-09 PROCEDURE — 1036F TOBACCO NON-USER: CPT | Performed by: NURSE PRACTITIONER

## 2025-04-09 PROCEDURE — 99204 OFFICE O/P NEW MOD 45 MIN: CPT | Performed by: NURSE PRACTITIONER

## 2025-04-09 PROCEDURE — 99214 OFFICE O/P EST MOD 30 MIN: CPT | Performed by: NURSE PRACTITIONER

## 2025-04-09 ASSESSMENT — ENCOUNTER SYMPTOMS
NEUROLOGICAL NEGATIVE: 1
ENDOCRINE NEGATIVE: 1
MUSCULOSKELETAL NEGATIVE: 1
GASTROINTESTINAL NEGATIVE: 1
PSYCHIATRIC NEGATIVE: 1
EYES NEGATIVE: 1
PALPITATIONS: 1
HEMATOLOGIC/LYMPHATIC NEGATIVE: 1
RESPIRATORY NEGATIVE: 1

## 2025-04-09 NOTE — PROGRESS NOTES
Referred by Dr. Moore for Chest Pain     History Of Present Illness:    Dear Deandra Moore CNP,     I had the pleasure of meeting Chance today at [facility] for evaluation of chest pain. The patient is seen in collaboration with Dr. Holder. Tiburcio is a very pleasant 19 year old gentleman without significant medical history, denies history of smoking. Has had persistent chest pain on the left side, describes as sharp in nature. Can occur at any time. Heart rate is elevated at rest, notices more at night. Denies shortness of breath. Complains of general fatigue.     Review of Systems   Constitutional: Positive for malaise/fatigue.   HENT: Negative.     Eyes: Negative.    Cardiovascular:  Positive for palpitations.   Respiratory: Negative.     Endocrine: Negative.    Hematologic/Lymphatic: Negative.    Skin: Negative.    Musculoskeletal: Negative.    Gastrointestinal: Negative.    Neurological: Negative.    Psychiatric/Behavioral: Negative.        Past Medical History:  He has a past medical history of Acute pharyngitis (05/08/2023), Anxiety disorder (05/08/2023), Cough (05/08/2023), Diarrhea (05/08/2023), Fracture of unspecified carpal bone, left wrist, initial encounter for closed fracture (08/11/2016), Malaise and fatigue (05/08/2023), Melanocytic nevus of right shoulder (05/08/2023), Other specified respiratory disorders (11/17/2016), Pneumonia, unspecified organism (11/17/2016), Short stature for age (05/08/2023), and Sore throat (05/08/2023).    Past Surgical History:  He has a past surgical history that includes Other surgical history (08/11/2016).      Social History:  He reports that he has never smoked. He has never been exposed to tobacco smoke. He has never used smokeless tobacco. He reports current alcohol use. He reports that he does not use drugs.    Family History:  No family history on file.     Allergies:  Poison ivy extract    Outpatient Medications:  Current  Outpatient Medications   Medication Instructions    cyanocobalamin (VITAMIN B-12) 250 mcg, Daily    ergocalciferol, vitamin D2, (VITAMIN D2 ORAL) Take by mouth.    sertraline (ZOLOFT) 50 mg, oral, Daily        Last Recorded Vitals:  Vitals:    04/09/25 1515   BP: 129/79   Pulse: 110   SpO2: 96%   Weight: 79.5 kg (175 lb 4.3 oz)       Physical Exam:  Physical Exam  Vitals reviewed.   HENT:      Head: Normocephalic.      Nose: Nose normal.   Eyes:      Pupils: Pupils are equal, round, and reactive to light.   Cardiovascular:      Rate and Rhythm: normal rhythm rate tachycardic   Pulmonary:      Effort: Pulmonary effort is normal.      Breath sounds: Normal breath sounds.   Abdominal:      General: Abdomen is flat.      Palpations: Abdomen is soft.   Musculoskeletal:         General: Normal range of motion.      Cervical back: Normal range of motion.   Skin:     General: Skin is warm and dry.   Neurological:      General: No focal deficit present.      Mental Status: He is alert and oriented to person, place, and time.   Psychiatric:         Mood and Affect: Mood normal.            Last Labs:  CBC -  Lab Results   Component Value Date    WBC 6.4 03/31/2025    HGB 16.9 03/31/2025    HCT 49.1 03/31/2025    MCV 86.4 03/31/2025     03/31/2025       CMP -  Lab Results   Component Value Date    CALCIUM 10.1 03/31/2025    PROT 7.9 03/31/2025    ALBUMIN 5.1 03/31/2025    AST 23 03/31/2025    ALT 37 03/31/2025    ALKPHOS 60 03/31/2025    BILITOT 0.6 03/31/2025       LIPID PANEL -   Lab Results   Component Value Date    CHOL 209 (H) 10/08/2022    TRIG 117 10/08/2022    HDL 47.7 10/08/2022    CHHDL 4.4 10/08/2022    LDLF 138 (H) 10/08/2022    VLDL 23 10/08/2022    NHDL 161 (H) 10/08/2022       RENAL FUNCTION PANEL -   Lab Results   Component Value Date    GLUCOSE 84 03/31/2025     03/31/2025    K 4.2 03/31/2025     03/31/2025    CO2 30 03/31/2025    ANIONGAP 10 03/31/2025    BUN 15 03/31/2025    CREATININE 0.86  "03/31/2025    CALCIUM 10.1 03/31/2025    ALBUMIN 5.1 03/31/2025        No results found for: \"BNP\", \"HGBA1C\"    Last Cardiology Tests:  ECG:  ECG independently reviewed showed sinus rhythm   Echo:    Ejection Fractions:    Cath:    Stress Test:    Cardiac Imaging:      Assessment/Plan   Chance is a very pleasant 19 year old gentleman without significant medical history, complains of intermittent sharp chest pain and an elevated heart rate at rest. He will have a heart monitor to assess for arrthymia. Echocardiogram to assess his LV function. Heart rate and blood pressure is well controlled today.     Plan   -call with any questions   -no medication changes   -CT calcium score   -echocardiogram   -heart monitor for two weeks   -will call to review the results     I appreciate the opportunity to participate in the patient's care, please call with any questions         Sherry Shepard, APRN-CNP  "

## 2025-04-09 NOTE — PATIENT INSTRUCTIONS
CALL WITH ANY QUESTIONS   NO MEDICATION CHANGES   CT CALCIUM SCORE   ECHOCARDIOGRAM   HEART MONITOR FOR TWO WEEKS   WILL CALL TO REVIEW THE RESULTS

## 2025-05-05 ENCOUNTER — APPOINTMENT (OUTPATIENT)
Dept: CARDIOLOGY | Facility: CLINIC | Age: 20
End: 2025-05-05
Payer: COMMERCIAL

## 2025-05-08 ENCOUNTER — HOSPITAL ENCOUNTER (OUTPATIENT)
Dept: CARDIOLOGY | Facility: CLINIC | Age: 20
Discharge: HOME | End: 2025-05-08
Payer: COMMERCIAL

## 2025-05-08 DIAGNOSIS — R00.2 HEART PALPITATIONS: ICD-10-CM

## 2025-05-08 DIAGNOSIS — R06.02 SHORTNESS OF BREATH: ICD-10-CM

## 2025-05-08 PROCEDURE — 93306 TTE W/DOPPLER COMPLETE: CPT

## 2025-05-08 PROCEDURE — 93246 EXT ECG>7D<15D RECORDING: CPT

## 2025-05-14 DIAGNOSIS — F41.9 ANXIETY: ICD-10-CM

## 2025-05-14 RX ORDER — SERTRALINE HYDROCHLORIDE 50 MG/1
50 TABLET, FILM COATED ORAL DAILY
Qty: 90 TABLET | Refills: 3 | Status: SHIPPED | OUTPATIENT
Start: 2025-05-14

## 2025-05-26 LAB
AORTIC VALVE MEAN GRADIENT: 3 MMHG
AORTIC VALVE PEAK VELOCITY: 1.18 M/S
AV PEAK GRADIENT: 6 MMHG
EJECTION FRACTION APICAL 4 CHAMBER: 46.6
EJECTION FRACTION: 58 %
LEFT ATRIUM VOLUME AREA LENGTH INDEX BSA: 21.2 ML/M2
LEFT VENTRICLE INTERNAL DIMENSION DIASTOLE: 4.84 CM (ref 3.5–6)
LEFT VENTRICULAR OUTFLOW TRACT DIAMETER: 2.19 CM
MITRAL VALVE E/A RATIO: 1.65
RIGHT VENTRICLE FREE WALL PEAK S': 15.35 CM/S
RIGHT VENTRICLE PEAK SYSTOLIC PRESSURE: 32.8 MMHG
TRICUSPID ANNULAR PLANE SYSTOLIC EXCURSION: 2.3 CM

## 2025-07-01 ENCOUNTER — APPOINTMENT (OUTPATIENT)
Dept: PRIMARY CARE | Facility: CLINIC | Age: 20
End: 2025-07-01
Payer: COMMERCIAL

## 2025-07-01 VITALS
HEART RATE: 88 BPM | DIASTOLIC BLOOD PRESSURE: 74 MMHG | HEIGHT: 64 IN | WEIGHT: 178 LBS | OXYGEN SATURATION: 96 % | BODY MASS INDEX: 30.39 KG/M2 | SYSTOLIC BLOOD PRESSURE: 110 MMHG

## 2025-07-01 DIAGNOSIS — R42 VERTIGO: ICD-10-CM

## 2025-07-01 DIAGNOSIS — R42 DIZZINESS: Primary | ICD-10-CM

## 2025-07-01 PROCEDURE — 1036F TOBACCO NON-USER: CPT

## 2025-07-01 PROCEDURE — 99213 OFFICE O/P EST LOW 20 MIN: CPT

## 2025-07-01 PROCEDURE — 3008F BODY MASS INDEX DOCD: CPT

## 2025-07-01 RX ORDER — MECLIZINE HCL 12.5 MG 12.5 MG/1
12.5 TABLET ORAL 3 TIMES DAILY PRN
Qty: 30 TABLET | Refills: 11 | Status: SHIPPED | OUTPATIENT
Start: 2025-07-01 | End: 2026-07-01

## 2025-07-01 ASSESSMENT — ENCOUNTER SYMPTOMS
CHILLS: 0
SINUS PRESSURE: 0
SORE THROAT: 0
DIARRHEA: 0
SINUS PAIN: 0
CONSTIPATION: 0
FEVER: 0
DYSURIA: 0
SHORTNESS OF BREATH: 0
NAUSEA: 0
CHEST TIGHTNESS: 0
ABDOMINAL PAIN: 0

## 2025-07-01 ASSESSMENT — PATIENT HEALTH QUESTIONNAIRE - PHQ9
SUM OF ALL RESPONSES TO PHQ9 QUESTIONS 1 AND 2: 0
1. LITTLE INTEREST OR PLEASURE IN DOING THINGS: NOT AT ALL
2. FEELING DOWN, DEPRESSED OR HOPELESS: NOT AT ALL

## 2025-07-01 NOTE — PROGRESS NOTES
"Subjective   Patient ID: Levy Figueroa Jr. is a 19 y.o. male who presents for Sick Visit (Patient is present in office for lightheadedness and nausea; no LOC x months - getting worse- when going up or down stairs or walking or standing. Drinking plenty of fluids and eating well. ).    HPI   Pt in today for sick visit. He has been dealing with lightheadedness and nausea. Has been going on for the last few months. Notices that it occurs more when going up or mercedes the stairs and he becomes short of breath. Feels that he is drinking plenty of fluids. Not taking anything for the symptoms. Has seen cardiology and wore a holter monitor everything was negative. Not currently exercising. Feels that he is drinking plenty of fluids.     Review of Systems   Constitutional:  Negative for chills and fever.   HENT:  Negative for ear pain, sinus pressure, sinus pain and sore throat.    Respiratory:  Negative for chest tightness and shortness of breath.    Cardiovascular:  Negative for chest pain.   Gastrointestinal:  Negative for abdominal pain, constipation, diarrhea and nausea.   Genitourinary:  Negative for dysuria.   Skin:  Negative for rash.   Neurological:  Negative for syncope.       Objective   /74   Pulse 88   Ht 1.626 m (5' 4\")   Wt 80.7 kg (178 lb)   SpO2 96%   BMI 30.55 kg/m²     Physical Exam  Vitals reviewed.   Constitutional:       Appearance: Normal appearance.   Cardiovascular:      Rate and Rhythm: Normal rate and regular rhythm.      Pulses: Normal pulses.      Heart sounds: Normal heart sounds.   Pulmonary:      Effort: Pulmonary effort is normal.      Breath sounds: Normal breath sounds.   Neurological:      General: No focal deficit present.      Mental Status: He is alert and oriented to person, place, and time.   Psychiatric:         Mood and Affect: Mood normal.         Behavior: Behavior normal.       Assessment/Plan   Diagnoses and all orders for this visit:  Dizziness  -     Referral to " Neurology; Future  -     Referral to ENT; Future  Vertigo  -     meclizine (Antivert) 12.5 mg tablet; Take 1 tablet (12.5 mg) by mouth 3 times a day as needed for dizziness.

## 2025-07-08 ENCOUNTER — APPOINTMENT (OUTPATIENT)
Dept: RADIOLOGY | Facility: HOSPITAL | Age: 20
End: 2025-07-08
Payer: COMMERCIAL

## 2025-07-29 ASSESSMENT — PROMIS GLOBAL HEALTH SCALE
CARRYOUT_PHYSICAL_ACTIVITIES: COMPLETELY
RATE_AVERAGE_FATIGUE: SEVERE
RATE_AVERAGE_PAIN: 0
RATE_SOCIAL_SATISFACTION: EXCELLENT
RATE_MENTAL_HEALTH: VERY GOOD
CARRYOUT_SOCIAL_ACTIVITIES: EXCELLENT
RATE_PHYSICAL_HEALTH: GOOD
RATE_GENERAL_HEALTH: VERY GOOD
RATE_QUALITY_OF_LIFE: EXCELLENT
EMOTIONAL_PROBLEMS: OFTEN

## 2025-07-30 ENCOUNTER — OFFICE VISIT (OUTPATIENT)
Dept: PRIMARY CARE | Facility: CLINIC | Age: 20
End: 2025-07-30
Payer: COMMERCIAL

## 2025-07-30 VITALS
HEIGHT: 64 IN | SYSTOLIC BLOOD PRESSURE: 120 MMHG | DIASTOLIC BLOOD PRESSURE: 79 MMHG | HEART RATE: 84 BPM | OXYGEN SATURATION: 96 % | WEIGHT: 180.2 LBS | BODY MASS INDEX: 30.77 KG/M2

## 2025-07-30 DIAGNOSIS — F41.9 ANXIETY: ICD-10-CM

## 2025-07-30 DIAGNOSIS — Z00.00 HEALTHCARE MAINTENANCE: Primary | ICD-10-CM

## 2025-07-30 PROCEDURE — 99395 PREV VISIT EST AGE 18-39: CPT

## 2025-07-30 PROCEDURE — 1036F TOBACCO NON-USER: CPT

## 2025-07-30 PROCEDURE — 3008F BODY MASS INDEX DOCD: CPT

## 2025-07-30 RX ORDER — SERTRALINE HYDROCHLORIDE 50 MG/1
50 TABLET, FILM COATED ORAL DAILY
Qty: 90 TABLET | Refills: 3 | Status: SHIPPED | OUTPATIENT
Start: 2025-07-30

## 2025-07-30 ASSESSMENT — ENCOUNTER SYMPTOMS
SHORTNESS OF BREATH: 0
SINUS PRESSURE: 0
FEVER: 0
ABDOMINAL PAIN: 0
DYSURIA: 0
NAUSEA: 0
CHEST TIGHTNESS: 0
CONSTIPATION: 0
CHILLS: 0
DIARRHEA: 0
SORE THROAT: 0
SINUS PAIN: 0

## 2025-07-30 NOTE — PROGRESS NOTES
Subjective   Patient ID: Levy Figueroa Jr. is a 19 y.o. male who presents for Annual Exam (Chance Figueroa Jr. is a 19 y.o. male is here today for a CPE. Patient reports No questions or concerns at this time./).    HPI   Pt is here for annual wellness.  Reports overall health is good. No issues or concerns.    Do you take any herbs or supplements that were not prescribed by a doctor? Yes magnesium and d3.   Are you taking calcium supplements? no  Are you taking aspirin daily? no  Colonoscopy: N/A  Fasting blood work: not needed  Last eye exam: not current  Last dental Exam: every six months  Exercise: not currently  Mood:pleasant  Sleep: no issues  Diet:  cutting out sugar, meal prepping struggles with carbs  Occupation:  nursing school  Do you have pain that bothers you in your daily life? Yes  Vaping   Rare occasions    1. Patient Counseling:  --Nutrition: Stressed importance of moderation in sodium/caffeine intake, saturated fat and cholesterol, caloric balance, sufficient intake of fresh fruits, vegetables, fiber, calcium, iron, and 1 mg of folate supplement per day (for females capable of pregnancy).  --Discussed the issue of estrogen replacement, calcium supplement, and the daily use of baby aspirin as appropriate per pt.  --Exercise: Stressed the importance of regular exercise.   --Substance Abuse: Discussed cessation/primary prevention of tobacco, alcohol, or other drug use; driving or other dangerous activities under the influence; availability of treatment for abuse.    --Sexuality: Discussed sexually transmitted diseases, partner selection, use of condoms, avoidance of unintended pregnancy  and contraceptive alternatives.   --Injury prevention: Discussed safety belts, safety helmets, smoke detector, smoking near bedding or upholstery.   --Dental health: Discussed importance of regular tooth brushing, flossing, and dental visits.  --Immunizations reviewed.  --Discussed benefits of colon cancer  "screening.  --After hours service discussed with patient  2 Discussed the patient's BMI.  The BMI is above average. The patient received Provided instructions on dietary changes  Provided instructions on exercise  Advised to Increase physical activity because they have an above normal BMI.  3 Follow up in one year    Review of Systems   Constitutional:  Negative for chills and fever.   HENT:  Negative for ear pain, sinus pressure, sinus pain and sore throat.    Respiratory:  Negative for chest tightness and shortness of breath.    Cardiovascular:  Negative for chest pain.   Gastrointestinal:  Negative for abdominal pain, constipation, diarrhea and nausea.   Genitourinary:  Negative for dysuria.   Skin:  Negative for rash.   Neurological:  Negative for syncope.       Objective   /79   Pulse 84   Ht 1.626 m (5' 4\")   Wt 81.7 kg (180 lb 3.2 oz)   SpO2 96%   BMI 30.93 kg/m²     Physical Exam  Vitals reviewed.   Constitutional:       Appearance: Normal appearance.     Cardiovascular:      Rate and Rhythm: Normal rate and regular rhythm.      Pulses: Normal pulses.      Heart sounds: Normal heart sounds.   Pulmonary:      Effort: Pulmonary effort is normal.      Breath sounds: Normal breath sounds.     Neurological:      General: No focal deficit present.      Mental Status: He is alert and oriented to person, place, and time.     Psychiatric:         Mood and Affect: Mood normal.         Behavior: Behavior normal.         Assessment/Plan   Diagnoses and all orders for this visit:  Healthcare maintenance  Anxiety  Comments:  Sertraline dose needs to be increased to 50 mg daily.  Referral placed for therapy close to home.  Call me as needed  Orders:  -     sertraline (Zoloft) 50 mg tablet; Take 1 tablet (50 mg) by mouth once daily.  Other orders  -     Follow Up In Primary Care - Health Maintenance; Future       "

## 2025-08-01 ENCOUNTER — HOSPITAL ENCOUNTER (EMERGENCY)
Facility: HOSPITAL | Age: 20
Discharge: HOME | End: 2025-08-01
Attending: EMERGENCY MEDICINE
Payer: COMMERCIAL

## 2025-08-01 VITALS
HEIGHT: 64 IN | BODY MASS INDEX: 29.02 KG/M2 | HEART RATE: 82 BPM | DIASTOLIC BLOOD PRESSURE: 87 MMHG | RESPIRATION RATE: 18 BRPM | SYSTOLIC BLOOD PRESSURE: 146 MMHG | WEIGHT: 170 LBS | OXYGEN SATURATION: 98 % | TEMPERATURE: 98.1 F

## 2025-08-01 DIAGNOSIS — S16.1XXA ACUTE STRAIN OF NECK MUSCLE, INITIAL ENCOUNTER: Primary | ICD-10-CM

## 2025-08-01 PROCEDURE — 99283 EMERGENCY DEPT VISIT LOW MDM: CPT | Performed by: EMERGENCY MEDICINE

## 2025-08-01 RX ORDER — CYCLOBENZAPRINE HCL 10 MG
10 TABLET ORAL 3 TIMES DAILY PRN
Qty: 21 TABLET | Refills: 0 | Status: SHIPPED | OUTPATIENT
Start: 2025-08-01

## 2025-08-01 ASSESSMENT — PAIN - FUNCTIONAL ASSESSMENT: PAIN_FUNCTIONAL_ASSESSMENT: 0-10

## 2025-08-01 ASSESSMENT — PAIN DESCRIPTION - LOCATION: LOCATION: NECK

## 2025-08-01 ASSESSMENT — PAIN SCALES - GENERAL
PAINLEVEL_OUTOF10: 6
PAINLEVEL_OUTOF10: 0 - NO PAIN

## 2025-08-01 NOTE — ED PROVIDER NOTES
HPI   Chief Complaint   Patient presents with    Neck Pain       Ewelina is a 19-year-old male who presents with neck pain.  Started on Wednesday and has been getting worse last night seem to hurt more.  Pain is in the right lateral neck and towards the right trapezius.  He has been taking Advil and had some Flexeril leftover from TMJ spasms.  He is not having any TMJ issues.  He notes that the messed up a little bit he has been using hot packs as well.  He denies any fever chills cough or cold.  He denies any known injury or heavy lifting.  He has had pains like this before but it is more intense now.  In the past he has had steroids which has not really helped and made him feel bad.  He denies any tobacco has a history of anxiety and takes medication for it.  He has no chest pain or shortness of breath.  History is from patient family and EMR.              Patient History   Medical History[1]  Surgical History[2]  Family History[3]  Social History[4]    Physical Exam   ED Triage Vitals [08/01/25 0604]   Temperature Heart Rate Respirations BP   36.7 °C (98.1 °F) 82 18 146/87      Pulse Ox Temp src Heart Rate Source Patient Position   98 % -- -- --      BP Location FiO2 (%)     -- --       Physical Exam  Vitals reviewed.   Constitutional:       General: He is awake.      Appearance: Normal appearance.   HENT:      Head: Normocephalic.      Nose: Nose normal.   Neck:      Comments: Decreased range of motion laterally and extension flexion due to discomfort.  He is able to turn a little more to the left away from the area than to the right.  He has some discomfort when I axial load as well.  There are no palpable lymph nodes in the head neck area or posterior scalp.  Cardiovascular:      Rate and Rhythm: Normal rate and regular rhythm.   Pulmonary:      Effort: Pulmonary effort is normal.      Breath sounds: Normal breath sounds.   Abdominal:      Palpations: Abdomen is soft.     Skin:     General: Skin is warm.       Capillary Refill: Capillary refill takes less than 2 seconds.     Neurological:      Mental Status: He is alert.           ED Course & MDM   Diagnoses as of 08/01/25 0637   Acute strain of neck muscle, initial encounter                 No data recorded     Brock Coma Scale Score: 15 (08/01/25 0616 : Hillary Moya, HELENA)                           Medical Decision Making  Patient appears to have a cervical strain.  He believes is made worse by waking up with his head in the wrong location on the pillow.  He has had similar discomfort in the past.  I recommended he switch from Advil to Aleve which is longer lasting and Flexeril 3 times a day.  We did discuss the option of steroids but they made him feel bad in the past does not want to take them.  We talked about heat and cold and other topical medicine that he could use.  He will follow-up.  He is off the next few days and will attempt to rest.  Patient understands return to case and follow-up instructions.  No sign of infectious etiology clinically doubt meningitis.        Procedure  Procedures       [1]   Past Medical History:  Diagnosis Date    Acute pharyngitis 05/08/2023    Anxiety 2012    Anxiety disorder 05/08/2023    Cough 05/08/2023    Diarrhea 05/08/2023    Fracture of unspecified carpal bone, left wrist, initial encounter for closed fracture 08/11/2016    Left wrist fracture    GERD (gastroesophageal reflux disease)     Malaise and fatigue 05/08/2023    Melanocytic nevus of right shoulder 05/08/2023    Other specified respiratory disorders 11/17/2016    Wheezing-associated respiratory infection    Pneumonia, unspecified organism 11/17/2016    Pneumonia of right upper lobe due to infectious organism    Short stature for age 05/08/2023    Sore throat 05/08/2023   [2]   Past Surgical History:  Procedure Laterality Date    CIRCUMCISION, PRIMARY  2004    OTHER SURGICAL HISTORY  08/11/2016    Treatment Of Forearm Fracture    WISDOM TOOTH EXTRACTION  2024   [3]    Family History  Problem Relation Name Age of Onset    Cancer Maternal Grandfather AJ    [4]   Social History  Tobacco Use    Smoking status: Never     Passive exposure: Never    Smokeless tobacco: Never   Vaping Use    Vaping status: Every Day    Substances: Nicotine    Devices: Disposable, Pre-filled pod    Passive vaping exposure: Yes   Substance Use Topics    Alcohol use: Not Currently    Drug use: Never        Dany Banks MD  08/01/25 0637

## 2025-08-20 ENCOUNTER — APPOINTMENT (OUTPATIENT)
Dept: PRIMARY CARE | Facility: CLINIC | Age: 20
End: 2025-08-20
Payer: COMMERCIAL

## 2025-08-26 ENCOUNTER — APPOINTMENT (OUTPATIENT)
Dept: PRIMARY CARE | Facility: CLINIC | Age: 20
End: 2025-08-26
Payer: COMMERCIAL

## 2025-08-26 ASSESSMENT — PATIENT HEALTH QUESTIONNAIRE - PHQ9
SUM OF ALL RESPONSES TO PHQ9 QUESTIONS 1 AND 2: 0
2. FEELING DOWN, DEPRESSED OR HOPELESS: NOT AT ALL
1. LITTLE INTEREST OR PLEASURE IN DOING THINGS: NOT AT ALL

## 2025-09-01 ASSESSMENT — ENCOUNTER SYMPTOMS
CHEST TIGHTNESS: 0
SORE THROAT: 0
CHILLS: 0
SHORTNESS OF BREATH: 0
FEVER: 0
CONSTIPATION: 0
ABDOMINAL PAIN: 0
NAUSEA: 0
SINUS PRESSURE: 0
SINUS PAIN: 0
DYSURIA: 0
DIARRHEA: 0

## 2025-09-26 ENCOUNTER — APPOINTMENT (OUTPATIENT)
Dept: OTOLARYNGOLOGY | Facility: CLINIC | Age: 20
End: 2025-09-26
Payer: COMMERCIAL

## 2026-02-13 ENCOUNTER — APPOINTMENT (OUTPATIENT)
Dept: NEUROLOGY | Facility: CLINIC | Age: 21
End: 2026-02-13
Payer: COMMERCIAL

## 2026-07-31 ENCOUNTER — APPOINTMENT (OUTPATIENT)
Dept: PRIMARY CARE | Facility: CLINIC | Age: 21
End: 2026-07-31
Payer: COMMERCIAL